# Patient Record
(demographics unavailable — no encounter records)

---

## 2025-04-15 NOTE — HISTORY OF PRESENT ILLNESS
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

## 2025-04-15 NOTE — ASSESSMENT
[FreeTextEntry1] : 9 year male This patient presents with chronic snoring and disrupted sleep raising the concern obstructive sleep apnea (TIFFANY). Given the patient's age and potential impact on growth, development, and behavior, we discussed treatment due to long term risk of quality of life issues, learning issues and, in severe cases, heart and lung problems.  Given current SDB symptoms and patient otherwise healthy would recommend considering adenotonsillectomy.  Also recurrent strep  Discussed TIFFANY and risks, alternatives, and benefits of adenotonsillectomy including observation or CPAP.  Risks of adenotonsillectomy discussed including, but not limited to, bleeding, infection, scarring, voice changes, pain, dehydration, persistence of sleep apnea, and regrowth of adenoids.  Briefly discussed risk of anesthesia but they will discuss more in depth with the anesthesiologist the day of the procedure.  Parent agreed to proceed to surgery and this will be scheduled accordingly.  Snoring and ATH on exam.  Discussed snoring vs UARS vs SDB vs TIFFANY.  Discussed that primary snoring is not harmful in and off itself but sleep apnea is different.  Often if we suspect SDB or TIFFANY, we recommend evaluating and treating due to long term risk of quality of life issues, learning issues and, in severe cases, heart and lung problems.  Given current SDB symptoms and patient otherwise healthy would recommend considering adenotonsillectomy.  Plan: Tonsillectomy and Adenoidectomy (17539) [facility] PST clearance

## 2025-04-15 NOTE — BIRTH HISTORY
[At ___ Weeks Gestation] : at [unfilled] weeks gestation [None] : No maternal complications [Passed] : passed [de-identified] : 37

## 2025-04-15 NOTE — REASON FOR VISIT
[Initial Consultation] : an initial consultation for [Sleep Apnea/ Snoring] : sleep apnea/ snoring [Throat Infections] : throat infections [Mother] : mother

## 2025-04-15 NOTE — DATA REVIEWED
[FreeTextEntry1] :   The relevant medical records as well as any testing, imaging, and other order results were independently reviewed and interpreted by me and discussed with family.